# Patient Record
Sex: FEMALE | Race: WHITE | URBAN - METROPOLITAN AREA
[De-identification: names, ages, dates, MRNs, and addresses within clinical notes are randomized per-mention and may not be internally consistent; named-entity substitution may affect disease eponyms.]

---

## 2024-03-15 ENCOUNTER — HOSPITAL ENCOUNTER (OUTPATIENT)
Dept: RADIOLOGY | Facility: CLINIC | Age: 84
Discharge: HOME | End: 2024-03-15
Payer: MEDICARE

## 2024-03-15 ENCOUNTER — OFFICE VISIT (OUTPATIENT)
Dept: ORTHOPEDIC SURGERY | Facility: CLINIC | Age: 84
End: 2024-03-15
Payer: MEDICARE

## 2024-03-15 DIAGNOSIS — M25.551 PAIN OF RIGHT HIP: Primary | ICD-10-CM

## 2024-03-15 DIAGNOSIS — M25.551 PAIN OF RIGHT HIP: ICD-10-CM

## 2024-03-15 PROCEDURE — 1157F ADVNC CARE PLAN IN RCRD: CPT | Performed by: ORTHOPAEDIC SURGERY

## 2024-03-15 PROCEDURE — 99213 OFFICE O/P EST LOW 20 MIN: CPT | Performed by: ORTHOPAEDIC SURGERY

## 2024-03-15 PROCEDURE — 73502 X-RAY EXAM HIP UNI 2-3 VIEWS: CPT | Mod: RT

## 2024-03-15 PROCEDURE — 73502 X-RAY EXAM HIP UNI 2-3 VIEWS: CPT | Mod: RIGHT SIDE | Performed by: ORTHOPAEDIC SURGERY

## 2024-03-15 NOTE — PROGRESS NOTES
"                    History of present illness    83-year-old female well-known to me had right total hip replacement back in 2020 she states she does not have any hip pain whatsoever she has had back surgery she has numbness and tingling in both legs she states that her biggest issue is just awaited she is walking she still walks with a limp she still uses a rollator.  She states that her back surgeon told her that \"a tendon was cut when she had her hip replacement\" and that is why she is limping.      Past medical , Surgical, Family and social history reviewed.      Physical exam  General: No acute distress and breathing comfortably.  Patient is pleasant and cooperative with the examination.    Extremity  Examination shows her hip incision to be well-healed she is got full strength in abduction and adduction she is got good hip flexion as well no signs of any tendon defects or strength defects about her hip joint.  She does have weakness and decreased sensation distally in both lower extremities    Diagnostics      XR hip right with pelvis when performed 2 or 3 views    Result Date: 3/15/2024  Interpreted By:  Natalie Quesada, STUDY: XR HIP RIGHT WITH PELVIS WHEN PERFORMED 2 OR 3 VIEWS; 3/15/2024 10:34 am   INDICATION: Signs/Symptoms:pain.   ACCESSION NUMBER(S): [id number]   ORDERING CLINICIAN: NATALIE QUESADA   FINDINGS: Two views show patient status post total hip replacement in good alignment.  No signs of fracture dislocation or other bony abnormality       Signed by: Natalie Quesada 3/15/2024 11:50 AM Dictation workstation:   [id number]    XR lumbar spine 2-3 views    Result Date: 3/5/2024  * * *Final Report* * * DATE OF EXAM: Mar  5 2024  2:32PM   VHX   5228  -  XR LUMBAR 3V AP/LAT/L5-S1  / ACCESSION #  [id number] PROCEDURE REASON: S/P lumbar fusion      * * * * Physician Interpretation * * * *  EXAMINATION / TECHNIQUE:  XR LUMBAR 3V AP/LAT/L5-S1 PATIENT/TECHNOLOGIST PROVIDED HISTORY:   chronic " "pain CLINICAL INFORMATION (AS PROVIDED BY ORDERING CLINICIAN) :  S/P lumbar fusion COMPARISON: 4/18/2023 RESULT: Counting reference:  Lumbosacral junction. For the purposes of this report, L4-5 is considered the level of the iliac crest and there are 5 lumbar-type vertebrae.  Anatomic Variants: None. L4-S1 spinal fusion bilateral pedicle screws at L4 and S1 and intervertebral device at L5-S1. Hardware is intact. Posterior decompression. Normal lumbar lordosis. Mild left convex curvature. No significant spondylolisthesis. Vertebral body heights are preserved. No acute fracture is identified. Moderate to severe degenerative disc disease from L2 to L4 with disc height loss, degenerative endplate changes, and osteophytes. Moderate degenerative disc disease elsewhere in the lumbar spine. Vascular calcifications. Partially visualized hip arthroplasties. No significant interval change.    IMPRESSION: Post-operative and degenerative changes, as described. No significant interval change. : ERICA   Transcribe Date/Time: Mar  5 2024  4:29P Dictated by : COLTON TYSON MD This examination was interpreted and the report reviewed and electronically signed by: COLTON TYSON MD on Mar  5 2024  4:31PM  EST       Procedure  [ none]    Assessment  Status post right total hip replacement with gait disturbance    Treatment plan  1.  The natural history of the condition and its associated treatment alternatives including surgical and nonsurgical options were discussed with the patient at length.  2.  I reassured her structurally everything looks okay as far as her hip replacement does I do not see any signs of any \"tendon defects\" about her hip joint but she does have some significant weakness distally I think some physical therapy to work on gait training of her hip and knee would be appropriate she will follow-up with me in 6 to 8 weeks.  3. [   ]  4.  All of the patient's questions were answered.    Orders Placed " This Encounter    XR hip right with pelvis when performed 2 or 3 views    Referral to Physical Therapy       This note was prepared using voice recognition software.  The details of this note are correct and have been reviewed, and corrected to the best of my ability.  Some grammatical areas may persist related to the Dragon software    Homar Quesada MD  Senior Attending Physician  [facility]  [facility] [place]    [phone]

## 2024-04-03 ENCOUNTER — EVALUATION (OUTPATIENT)
Dept: PHYSICAL THERAPY | Facility: CLINIC | Age: 84
End: 2024-04-03
Payer: MEDICARE

## 2024-04-03 DIAGNOSIS — R26.2 DIFFICULTY WALKING: ICD-10-CM

## 2024-04-03 DIAGNOSIS — M25.551 PAIN OF RIGHT HIP: Primary | ICD-10-CM

## 2024-04-03 PROCEDURE — 97110 THERAPEUTIC EXERCISES: CPT | Mod: GP

## 2024-04-03 PROCEDURE — 97161 PT EVAL LOW COMPLEX 20 MIN: CPT | Mod: GP

## 2024-04-03 ASSESSMENT — PATIENT HEALTH QUESTIONNAIRE - PHQ9
SUM OF ALL RESPONSES TO PHQ9 QUESTIONS 1 AND 2: 0
2. FEELING DOWN, DEPRESSED OR HOPELESS: NOT AT ALL
1. LITTLE INTEREST OR PLEASURE IN DOING THINGS: NOT AT ALL

## 2024-04-03 ASSESSMENT — ENCOUNTER SYMPTOMS
LOSS OF SENSATION IN FEET: 1
OCCASIONAL FEELINGS OF UNSTEADINESS: 1
DEPRESSION: 0

## 2024-04-03 NOTE — PROGRESS NOTES
"Patient Name: Keira Hernandez  MRN: [id number]  Time Calculation  Start Time: 1100  Stop Time: 1145  Time Calculation (min): 45 min  PT Evaluation Time Entry  PT Evaluation (Low) Time Entry: 25  PT Therapeutic Procedures Time Entry  Therapeutic Exercise Time Entry: 20                   Current Problem  1. Pain of right hip  Referral to Physical Therapy      2. Difficulty walking          Insurance      [employer] BOA COPAY 35 // Nohemi confirmed 3/27/24 10:49am  DED 0 COVERAGE 100 OOP 4200(102)  AUTH REQ# [id number] 1 VISIT 4-3-24 THRU 4-17-24  REF# CORIN GALAVIZ I- [id number]/ALL       Visit number:  1  Subjective     General: Pt is a 83 y.o. female who goes by \"Leigh Ann\". Presenting to clinic with c/o difficulty walking and poor balance. Extensive surgical history with multiple back operations, bilateral JERZY, achilles tear, and right TKA. Most recent procedure was ~1 year ago for her back. Was walking with a cane prior to surgery, now feels she can only walk with a rollator and does not trust her balance. Has had multiple falls in the last year but denies any injury from them. Also having pain that is related to activity, prolonged positiong. Feels she is able to walk about 20 minutes consecutively before needing a break.  Main goal of therapy is to improve balance and return to walking with a cane    Pertinent medical hx:  multiple back surgeries, bilateral hip and right knee replacement, achilles tear (2002)     Precautions:    Pain:  Current:  0/10 with rest/sitting  High: 8/10 in back/legs    Reviewed medical screening form with pt and medical screening assessed    Imaging:     Objective   Spine Musculoskeletal Exam    Gait  Gait additional comments: Decreased step height, decreased hip/foot control in swing bilaterally, but notably on L>R. Foot into  Heavy UE reliance on device.     Range of Motion    Thoracolumbar        Thoracolumbar range of motion additional comments: 8 inch functional reach   80 lumbar " flexion  18 degrees lumbar extension   14 degrees lumbar SB bilaterally  (p+)     Strength    Thoracolumbar       Right      Hip flexion: 4+/5.        Left      Hip flexion: 4+/5.      General  General additional comments: Functional Reach:  8 inches     Hip Musculoskeletal Exam  Gait  Gait additional comments: Decreased step height, decreased hip/foot control in swing bilaterally, but notably on L>R. Foot into  Heavy UE reliance on device.     Strength    Right      Flexion: 4+/5.       Internal rotation: 4/5.       External rotation: 4/5.     Left      Flexion: 4+/5.       Internal rotation: 4/5.       External rotation: 4/5.     General  General additional comments: Functional Reach:  8 inches     Knee Musculoskeletal Exam  Gait  Gait additional comments: Decreased step height, decreased hip/foot control in swing bilaterally, but notably on L>R. Foot into  Heavy UE reliance on device.     Strength    Right      Extension: 4+/5.       Flexion: 4+/5.     Left      Extension: 4+/5.       Flexion: 4+/5.     General  General additional comments: Functional Reach:  8 inches         Outcome Measures:  LEFS:  45     5x STS:   10.7, average of 2 trials.   TUG: 10.78 (average of 3 trials) with device     Treatment: See HEP below      EDUCATION/HEP:    Yellow TB Seated hip IR with add squeeze, 2x10 each leg, 4-5x/week   SLS with counter/chair support:  6x30s each leg, 1x/day    **Emphasized importance of performing this exercise in a corner with BUE support and  stable object in front (not 4WW). Pt in agreement       Assessment:     Pt is a 83 y.o. presenting with the following primary deficits:  low back and BLE pain, decreased balance, and gait abnormalities. These deficits have lead to increased frequency of falls, as well as functional impairments with walking and stair navigation. Functional testing for TUG does not reveal additional fall risk with 4WW, and 5x STS is normative. Functional reach is below threshold for  fall risk. Recommend skilled PT to address the aforementioned deficits and allow pt to restore PLOF and maximize functional independence as she currently lives alone.     Clinical Presentation: Stable    Level of Complexity: moderate given pt's extensive surgical history     Goals:      Pt to be IND with HEP  2. Bilateral hip IR/ER  MMT to greater than or equal to 4+/5 to demonstrate improved hip muscle strength and stability for balance and ambulation.   3. Pt to demonstrate functional reach greater than or equal to 10 inches for reduction of fall risk.   4. Pt to self report LEFS score of greater than or equal to 54 to demonstrate statistically significant improvement in function.     Plan  1x/week for  8 visits     Balance work, gait training with 4WW, trial with cane when appropriate. Hip strengthening, lumbar ROM to maintain function.       Planned interventions include: blood flow restriction, aquatic therapy, biofeedback, cryotherapy, dry needling, edema control, education/instruction, electrical stimulation, gait training, home program, hot pack, kinesiotaping, manual therapy, neuromuscular re-education, self care/home management, therapeutic activities, therapeutic exercises, ultrasound and vasopneumatic device w/ cold.    No

## 2024-04-24 ENCOUNTER — TREATMENT (OUTPATIENT)
Dept: PHYSICAL THERAPY | Facility: CLINIC | Age: 84
End: 2024-04-24
Payer: MEDICARE

## 2024-04-24 PROCEDURE — 97110 THERAPEUTIC EXERCISES: CPT | Mod: GP,CQ

## 2024-04-24 NOTE — PROGRESS NOTES
Physical Therapy Treatment    Patient Name: Keira Hernandez  MRN: [id number]  Today's Date: 4/24/2024             Current Problem  1. Pain of right hip  Referral to Physical Therapy       2. Difficulty walking            Subjective     General   Extensive surgical history with multiple back operations, bilateral JERZY, achilles tear, and right TKA.     Main goal of therapy is to improve balance and return to walking with a cane   Insurance   [employer] BOA COPAY 35 // Nohemi confirmed 3/27/24 10:49am  DED 0 COVERAGE 100 OOP 4200(102)  AUTH REQ# [id number] 1 VISIT 4-3-24 THRU 4-17-24  REF# CORIN GALAVIZ I- [id number]/ALL     Visit number:  2  Precautions     Pain         Objective   Treatments:    Assessment     Plan:  Progress with POC as tolerated.    OP EDUCATION:       Goals:

## 2024-04-24 NOTE — PROGRESS NOTES
Physical Therapy Treatment    Patient Name: Keira Hernandez  MRN: [id number]  Today's Date: 4/24/2024  Time Calculation  Start Time: 1617  Stop Time: 1650  Time Calculation (min): 33 min  PT Therapeutic Procedures Time Entry  Therapeutic Exercise Time Entry: 30       Current Problem  1. Pain of right hip  Referral to Physical Therapy       2. Difficulty walking            Subjective     General   Extensive surgical history with multiple back operations, bilateral JERZY, achilles tear, and right TKA.     Pt. Reports she has pain in B LE's today L>R, and in her lumbar spine.     Insurance   [employer] BOA COPAY 35 // Nohemi confirmed 3/27/24 10:49am  DED 0 COVERAGE 100 OOP 4200(102)  AUTH REQ# [id number] 1 VISIT 4-3-24 THRU 4-17-24  REF# MARCELRIVIDHI GALVAIZ I- [id number]/ALL     Visit number:  2/8    Precautions     Pain    Objective   Treatments:  Prone lying   JAROCHO  Stand Ext's x20    Assessment   Pt. Stated prone lying abolished her radicular symptoms and slightly made the pain in her lumbar spine worse, which was explained to her that it was a good thing at this time. Trialed JAROCHO next and she stated there were still no radicular symptoms and her back became a little more uncomfortable. Pt. Then returned to prone lying and felt much better in this position and her back pain was eventually abolished as well. Pt. Given updated HEP to perform every 2hrs.     Plan:  Continue to focus on radicular symptoms and increase strength for ADLs.     OP EDUCATION:       Goals:   Main goal of therapy is to improve balance and return to walking with a cane

## 2024-04-30 NOTE — PROGRESS NOTES
"Physical Therapy Treatment    Patient Name: Keira Hernandez  MRN: [id number]  Today's Date: 5/1/2024  Time Calculation  Start Time: 1005  Stop Time: 1045  Time Calculation (min): 40 min     PT Therapeutic Procedures Time Entry  Neuromuscular Re-Education Time Entry: 10  Therapeutic Exercise Time Entry: 28                 Current Problem  1. Pain of right hip        2. Difficulty walking            Insurance:  [employer] MED[employer] BOA COPAY 35 // Nohemi confirmed 3/27/24 10:49am  DED 0 COVERAGE 100 OOP 4200(102)  AUTH REQ# [id number] 1 VISIT 4-3-24 THRU 4-17-24  REF# DEDTHU GALAVIZ I- [id number]/ALL      Visit number:  3/8     Precautions   none    Subjective   Subjective:   Pt says she has pain from her L waist down to foot. Pt says she hasn't done a lot today, so her pain isn't bad. Things like carrying groceries and vacuming. Pt says sometimes the standing back bends make pain go down her leg, but can't really get on her stomach to do laying ex. Pt states she usually feels better if she is standing up straight  Pain   1/10    Objective   Treatments:  Yellow TB Seated hip IR with add squeeze, 2x10 each leg,   Seated IR isometric 5\"x5 B  SLS with counter/chair support:  6x30s each leg,---  Sit to stands w/o UE 10x  Sidestepping //bars 3 laps  Step ups Fwd //bars B 10x ea  EC NBOS // bars 20\"x3  Standing hip abd/ext B // 10x ea  Marching // bars 15x    Prone lying--   JAROCHO--  Stand Ext's x20---     OP EDUCATION:   Strengthening B LE without causing more back/leg pain      Assessment:   Pt had a little increased L low back pain with seated Tb IR. Reminders with ex for good upright posture. Cues given with sit to stands to move more slowly. Pt had difficulty doing side stepping, as her hips would drop and she would fwd flex the trunk.Pt has difficulty performing hip rotational ex without compensations from other muscles. Pt displayed fair stability with NBOS. The increased L back pain that pt felt with some ex, went away when " she stopped the exercises.         Plan:   Continue to focus on radicular symptoms and increase strength for ADLs.

## 2024-05-01 ENCOUNTER — OFFICE VISIT (OUTPATIENT)
Dept: ORTHOPEDIC SURGERY | Facility: CLINIC | Age: 84
End: 2024-05-01
Payer: MEDICARE

## 2024-05-01 ENCOUNTER — TREATMENT (OUTPATIENT)
Dept: PHYSICAL THERAPY | Facility: CLINIC | Age: 84
End: 2024-05-01
Payer: MEDICARE

## 2024-05-01 DIAGNOSIS — M25.551 PAIN OF RIGHT HIP: Primary | ICD-10-CM

## 2024-05-01 DIAGNOSIS — R26.2 DIFFICULTY WALKING: ICD-10-CM

## 2024-05-01 PROCEDURE — 1157F ADVNC CARE PLAN IN RCRD: CPT | Performed by: ORTHOPAEDIC SURGERY

## 2024-05-01 PROCEDURE — 99213 OFFICE O/P EST LOW 20 MIN: CPT | Performed by: ORTHOPAEDIC SURGERY

## 2024-05-01 PROCEDURE — 97112 NEUROMUSCULAR REEDUCATION: CPT | Mod: GP,CQ

## 2024-05-01 PROCEDURE — 97110 THERAPEUTIC EXERCISES: CPT | Mod: GP,CQ

## 2024-05-01 NOTE — PROGRESS NOTES
Subjective    Patient ID: Keira    Chief Complaint:   Chief Complaint   Patient presents with    Right Hip - Follow-up     RT THR 8/20/20 with gait disturbance, s/p physical therapy     History of present illness    83-year-old female presented to clinic today for follow-up evaluation right SI joint dysfunction.  She states that she is doing much better after PT.  She still feels slight gait abnormality but this is improving.  She is ambulating with the assistance of a rollator.  She has mild discomfort which is constant.  She has been dealing with this chronically for many years.  Previous history of left total hip replacement with complications as well.      Past medical , Surgical, Family and social history reviewed.      Physical exam  General: No acute distress and breathing comfortably.  Patient is pleasant and cooperative with the examination.    Extremity  Right hip is neurovascular intact.  She has good range of motion today.  No groin pain.  Negative straight leg raise test.  No tenderness palpation over the hip bursa.  Improving strength.  No calf swelling or tenderness.  No instability.    Diagnostics  [ none]  No results found.     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  Patient was encouraged to continue with outpatient physical therapy  2.  Continue weightbearing activity as tolerated.  3.  Follow-up with us if she needs anything in future.   for complete plan and/or surgical details, please refer to Dr. Quesada's portion of the split/shared dictation.  4.  All of the patient's questions were answered.    No orders of the defined types were placed in this encounter.      This note was prepared using voice recognition software.  The details of this note are correct and have been reviewed, and corrected to the best of my ability.  Some grammatical areas may persist related to the Dragon software    Rudolph Yost PA-C,  MPAS  [facility]  [facility] [place]    [phone]    In a face-to-face encounter performed today, I Homar Quesada MD performed a history and physical examination, discussed pertinent diagnostic studies if indicated, and discussed diagnosis and management strategies with both the patient and the midlevel provider.  I reviewed the midlevel's note and agree with the documented findings and plan of care.  Greater than 50% of the evaluation and treatment decision was performed by the physician myself during today's visit.    83-year-old female here for follow-up of her hip on the right side she states she doing better the therapy definitely seems to be helping she still using a rollator for assistive device but happy with her progress so far in therapy.  On examination she has good range of motion no tenderness to palpation improved muscle strength.  Plan is to continue with her physical therapy continue work on range of motion and strengthening and follow-up with me if she has increased pain or discomfort.      Patient has severe impairment related to her presenting condition.  It is significantly impairing bodily function.  We did discuss surgical and nonsurgical options.    This note was prepared using voice recognition software.  The details of this note are correct and have been reviewed, and corrected to the best of my ability.  Some grammatical areas may persist related to the Dragon software    Homar Quesada MD  Senior Attending Physician  [facility]    [phone]

## 2024-05-08 ENCOUNTER — TREATMENT (OUTPATIENT)
Dept: PHYSICAL THERAPY | Facility: CLINIC | Age: 84
End: 2024-05-08
Payer: MEDICARE

## 2024-05-08 DIAGNOSIS — M25.551 PAIN OF RIGHT HIP: Primary | ICD-10-CM

## 2024-05-08 DIAGNOSIS — R26.2 DIFFICULTY WALKING: ICD-10-CM

## 2024-05-08 PROCEDURE — 97110 THERAPEUTIC EXERCISES: CPT | Mod: GP,CQ

## 2024-05-08 PROCEDURE — 97112 NEUROMUSCULAR REEDUCATION: CPT | Mod: GP,CQ

## 2024-05-14 NOTE — PROGRESS NOTES
"Physical Therapy Treatment    Patient Name: Keira Hernandez  MRN: [id number]  Today's Date: 5/15/2024  Time Calculation  Start Time: 1008  Stop Time: 1048  Time Calculation (min): 40 min     PT Therapeutic Procedures Time Entry  Neuromuscular Re-Education Time Entry: 10  Therapeutic Exercise Time Entry: 28                 Current Problem  1. Pain of right hip        2. Difficulty walking            Insurance:  ANTH MEDICARE BOA COPAY 35 // Nohemi confirmed 3/27/24 10:49am  DED 0 COVERAGE 100 OOP 4200(102)  AUTH REQ# [id number] 1 VISIT 4-3-24 THRU 4-17-24  REF# CORIN GALAVIZ I- [id number]/ALL      Visit number:  5/8  Precautions   none    Subjective   Subjective:   Pt says she has been doing some of the exercises, but can't do too many, because her back still hurts. Pt has been using a roll behind back for support. Pt had cramps in the L LE last night.  Pain   0/10    Objective   Treatments:  Yellow TB Seated hip IR with add squeeze, 2x10 each leg  Seated IR isometric 5\"x7 B---  SLS with counter/chair support:  6x30s each leg,---  Sit to stands w/o UE   10x2  Sidestepping //bars YTB 2 laps---  Step ups Fwd 6 in B 12x ea  Step ups Lat 6in B 12x ea  Tap downs B 5x ea  Hip hiking B 10x ea  EC NBOS // bars 20\"x3----  Standing hip abd/ext B YTB 10x ea (ext only today)---  Marching w/YTB15x 10x---  Step overs // bars 4in B 12x ea  // bars SLB with knee slightly bent Reviewed     Prone lying--   JAROCHO--  Stand Ext's x20---  OP EDUCATION:   SLB w/ knee slightly bent      Assessment:   Pt challenged to use correct form with tap downs and hip hikes, even with tactile and verbal cues provided. Pt had some R Low back pain when doing L tap downs. Pt able to use good control with sit to stands. Pt performed step overs in // bars without as much of the R hip pushing out laterally. Trialed amb with cane in R hand to avoid L shoulder leaning. Pt R knee tends to extend back quickly.Initially pt used better form, but then the lean " returned. Upon leaving clinic advised to be mindful of speed and hip. Pt able to amb without as much of a deviated gait    Plan:   Cont to improve hip strength, gait, and balance

## 2024-05-15 ENCOUNTER — TREATMENT (OUTPATIENT)
Dept: PHYSICAL THERAPY | Facility: CLINIC | Age: 84
End: 2024-05-15
Payer: MEDICARE

## 2024-05-15 DIAGNOSIS — R26.2 DIFFICULTY WALKING: ICD-10-CM

## 2024-05-15 DIAGNOSIS — M25.551 PAIN OF RIGHT HIP: Primary | ICD-10-CM

## 2024-05-15 PROCEDURE — 97112 NEUROMUSCULAR REEDUCATION: CPT | Mod: GP,CQ

## 2024-05-15 PROCEDURE — 97110 THERAPEUTIC EXERCISES: CPT | Mod: GP,CQ

## 2024-05-21 NOTE — PROGRESS NOTES
"Physical Therapy Treatment    Patient Name: Keira Hernandez  MRN: [id number]  Today's Date: 5/22/2024  Time Calculation  Start Time: 1006  Stop Time: 1050  Time Calculation (min): 44 min     PT Therapeutic Procedures Time Entry  Neuromuscular Re-Education Time Entry: 12  Therapeutic Exercise Time Entry: 30                 Current Problem  1. Pain of right hip        2. Difficulty walking            Insurance:  ANTH MEDICARE BOA COPAY 35 // Nohemi confirmed 3/27/24 10:49am  DED 0 COVERAGE 100 OOP 4200(102)  AUTH REQ# [id number] 1 VISIT 4-3-24 THRU 4-17-24  REF# CORIN GALAVIZ I- [id number]/ALL   Visit number:  6/8    Precautions   none    Subjective   Subjective:   Pt states that her back is doing okay. She still gets pain if she walks for long periods, or if she sits for too long with towel roll behind her back.   Pain   0/10    Objective   Treatments:  Yellow TB Seated hip IR with add squeeze, 2x10 each leg-Reviewed    Sit to stands w/o UE   10x2 (10x w/ TB around thighs, 10x with staggard stance/R closer)   W Sidestepping //bars YTB 2 laps  Monster walks // bars YTB 2 laps  Step ups Fwd 6 in B 12x ea  Step ups Lat 6in B 12x ea  Standing glute med kicks B (YTB only for R kicking) 10x2  Step overs // bars 6in B 12x ea    Seated IR isometric 5\"x7 B---  SLS with counter/chair support:  6x30s each leg,---  Tap downs B 5x ea--  Hip hiking B 10x ea---  EC NBOS // bars 20\"x3----  Standing hip abd/ext B YTB 10x ea (ext only today)---  Marching w/YTB15x 10x---  // bars SLB with knee slightly bent---     Prone lying--   JAROCHO--  Stand Ext's x20---  OP EDUCATION:   Postural awareness, and not amb too quickly      Assessment:   Pt had observable L pelvis drop when doing R fwd step ups. Pelvis dropping wasn't as noticeable with L step ups. Pt had increased L low back pain with lat step ups. Pt had difficulty standing on R side for L glute kicks, thus did without TB. Introduced more hip strengthening with W and moster stepping. Pt " had to use UE support with these. Able to increase height for step ups, without sacrificing form.     Plan:   Cont with hip strengthening.

## 2024-05-22 ENCOUNTER — TREATMENT (OUTPATIENT)
Dept: PHYSICAL THERAPY | Facility: CLINIC | Age: 84
End: 2024-05-22
Payer: MEDICARE

## 2024-05-22 DIAGNOSIS — R26.2 DIFFICULTY WALKING: ICD-10-CM

## 2024-05-22 DIAGNOSIS — M25.551 PAIN OF RIGHT HIP: Primary | ICD-10-CM

## 2024-05-22 PROCEDURE — 97110 THERAPEUTIC EXERCISES: CPT | Mod: GP,CQ

## 2024-05-22 PROCEDURE — 97112 NEUROMUSCULAR REEDUCATION: CPT | Mod: GP,CQ

## 2024-05-29 ENCOUNTER — APPOINTMENT (OUTPATIENT)
Dept: PHYSICAL THERAPY | Facility: CLINIC | Age: 84
End: 2024-05-29
Payer: MEDICARE

## 2024-06-06 ENCOUNTER — TREATMENT (OUTPATIENT)
Dept: PHYSICAL THERAPY | Facility: CLINIC | Age: 84
End: 2024-06-06
Payer: MEDICARE

## 2024-06-06 DIAGNOSIS — R26.2 DIFFICULTY WALKING: ICD-10-CM

## 2024-06-06 DIAGNOSIS — M25.551 PAIN OF RIGHT HIP: Primary | ICD-10-CM

## 2024-06-06 PROCEDURE — 97110 THERAPEUTIC EXERCISES: CPT | Mod: GP

## 2024-06-06 NOTE — PROGRESS NOTES
"Patient Name: Keira Hernandez  MRN: [id number]  Time Calculation  Start Time: 0829  Stop Time: 0911  Time Calculation (min): 42 min     PT Therapeutic Procedures Time Entry  Therapeutic Exercise Time Entry: 42                     Current Problem  1. Pain of right hip        2. Difficulty walking            Insurance  [employer] BO[employer] COPAY 35 // Nohemi confirmed 3/27/24 10:49am  DED 0 COVERAGE 100 OOP 4200(102)  AUTH REQ# [id number] 1 VISIT 4-3-24 THRU 4-17-24  REF# DEDRIVIDHI S I- [id number]/ALL   Subjective     General  Pt feels she has made great progress to this point, feels much more confident with her balance than before. States she will occasionally walk without her device as long as she has objects to steady herself on  Precautions    Pain  0/10    Objective     Trendelenburg gait persists, decreased LE control through swing to initial contact (R>L)     SL balance:  unable to hold for >1s without UE support. 40s bilaterally with single UE support in // bars    LEFS:  45     Hip MMT    RLE  IR:  4+/5  ER:  4+/5    LLE  IR:  4+/5  ER:  4+/5     Functional reach:  11 inches     Treatments:    STS no UE support:  1x15, x10 w/ unsupported march   Step taps:  x15 BLE , use of single UE support (cues for wide TAVIA  Standing hip abd:  BW RLE, YTB LLE:  x15   SLS in // bars with single UE support :  x3 to fatigue BLE (40s for both)  Step ups in // bars:  x15, 6\" step     NT:    W Sidestepping //bars YTB 2 laps  Monster walks // bars YTB 2 laps  Step ups Lat 6in B 12x ea  Standing glute med kicks B (YTB only for R kicking) 10x2  Step overs // bars 6in B 12x ea    OP EDUCATION/HEP:  Discussed pt's current progress. Pt and PT in agreement that more visits would be beneficial to her as she lives alone and wants to maximize her functional independence.           Assessment   Pt seen for re-check this time. Has made good progress towards her goals currently. Improvements most notable with her functional reach and hip " rotation strength. Overall pt feels she is moving better and is more confident in her balance. Deficits most notably persisting  with hip strength and SL balance. Recommend continued skilled PT to address these deficits and allow pt to maximize her functional independence with mobility.     Pt to be IND with HEP (in progress)   2. Bilateral hip IR/ER  MMT to greater than or equal to 4+/5 to demonstrate improved hip muscle strength and stability for balance and ambulation. (MET)   3. Pt to demonstrate functional reach greater than or equal to 10 inches for reduction of fall risk. (MET)  4. Pt to self report LEFS score of greater than or equal to 54 to demonstrate statistically significant improvement in function (NOT MET)   Plan     Continue per previous POC. Emphasis on hip abduction strengthening, SLS practice, integrate SPC gait training if able

## 2024-06-26 ENCOUNTER — TREATMENT (OUTPATIENT)
Dept: PHYSICAL THERAPY | Facility: CLINIC | Age: 84
End: 2024-06-26
Payer: MEDICARE

## 2024-06-26 DIAGNOSIS — R26.2 DIFFICULTY WALKING: ICD-10-CM

## 2024-06-26 DIAGNOSIS — M25.551 PAIN OF RIGHT HIP: Primary | ICD-10-CM

## 2024-06-26 PROCEDURE — 97110 THERAPEUTIC EXERCISES: CPT | Mod: GP

## 2024-06-26 PROCEDURE — 97112 NEUROMUSCULAR REEDUCATION: CPT | Mod: GP

## 2024-06-26 NOTE — PROGRESS NOTES
"Patient Name: Keira Hernandez  MRN: [id number]  Time Calculation  Start Time: 1002  Stop Time: 1041  Time Calculation (min): 39 min     PT Therapeutic Procedures Time Entry  Neuromuscular Re-Education Time Entry: 31  Therapeutic Exercise Time Entry: 8                     Current Problem  1. Pain of right hip        2. Difficulty walking            Insurance  [employer] BO[employer] COPAY 35 // Nohemi confirmed 3/27/24 10:49am  DED 0 COVERAGE 100 OOP 4200(102)  AUTH REQ# [id number] 1 VISIT 4-3-24 THRU 4-17-24  REF# DEDRIVIDHI S I- [id number]/ALL   Subjective     General  Pt reports things have gone well since re-check on 6/6, no major changes since then.   Precautions    Pain  1/10 in left low back.    Objective     Treatments:  Ther-ex:  STS no UE support:  1x15  Standing hip abd:  BW RLE, YTB LLE:  x15   Step taps:  2x20 BLE , 2#->3# use of minimal UE support.     Neuro re-ed:  Navigating obstacle in // bars:  ~4\" clearance, 2x10 over and back  SLS in // bars with single UE support :  x3 to fatigue BLE (45-50s)  Ambulation w/o device:  x5 laps, external cue to avoid scissoring across line on the floor when placing foot. (Min-modA throughout, gait belt donned)     NT:  Step ups in // bars:  x15, 6\" step    OP EDUCATION/HEP:  Encouraged pt to continue practicing SLS at home, weaning off of using UE support as able    Assessment   Pt with good tolerance to exercises this date, continued with balance/gait progressions. Added weight to step taps, introduced activities to improve hip flexion/foot clearance during gait. Pt required VC to slow down step overs to focus on improving hip flexion, good carryover of cues with repetition. Pt fatigued at EOS but no adverse effects noted. Min-modA required with ambulation. Recommend continued skilled PT to improve BLE strength and balance in order to maximize safety and independence with functional mobility.     Plan     Continue per POC. Progress balance activities/continue " practicing gait w/o, practice for foot placement/controle

## 2024-07-03 ENCOUNTER — TREATMENT (OUTPATIENT)
Dept: PHYSICAL THERAPY | Facility: CLINIC | Age: 84
End: 2024-07-03
Payer: MEDICARE

## 2024-07-03 DIAGNOSIS — M25.551 PAIN OF RIGHT HIP: Primary | ICD-10-CM

## 2024-07-03 DIAGNOSIS — R26.2 DIFFICULTY WALKING: ICD-10-CM

## 2024-07-03 PROCEDURE — 97110 THERAPEUTIC EXERCISES: CPT | Mod: GP

## 2024-07-03 PROCEDURE — 97112 NEUROMUSCULAR REEDUCATION: CPT | Mod: GP

## 2024-07-03 NOTE — PROGRESS NOTES
"Patient Name: Keira Hernandez  MRN: [id number]  Time Calculation  Start Time: 1216  Stop Time: 1300  Time Calculation (min): 44 min     PT Therapeutic Procedures Time Entry  Neuromuscular Re-Education Time Entry: 29  Therapeutic Exercise Time Entry: 15                     Current Problem  1. Pain of right hip        2. Difficulty walking            Insurance  [employer] BOA COPAY 35 // Nohemi confirmed 3/27/24 10:49am  DED 0 COVERAGE 100 OOP 4200(102)  AUTH REQ# [id number] 1 VISIT 4-3-24 THRU 4-17-24  REF# DEDRIE S I- [id number]/ALL   Subjective     General  Pt reports \"I always feels good, I just can't walk well \". No major changes since last visit   Precautions    Pain  0/10    Objective     Treatments:    Ther-ex:  -STS no UE support:  1x15  -Standing hip abd:  YTB 2x10 BLE in // bars   -Step taps:  2x20 BLE , 4# use of minimal UE support, 6\" step      Neuro re-ed:  -Navigating obstacle in // bars:  ~4\" clearance, 1x10 over and back  -Progressed to 6\" step, full step overs with focus on wide TAVIA upon forward/backward stepping  -Ambulation w/o device:  x5 laps, external cue to avoid scissoring across line on the floor when placing foot. (CGA-Darrion throughout, gait belt donned)       NT:   SLS in // bars with single UE support :  x3 to fatigue BLE (45-50s)  OP EDUCATION/HEP:  No changes to HEP at this time     Assessment   Good tolerance to interventions this date. Increased resistance of hip abduction with RLE, and increased height of obstacle navigation in // bars. Pt demo's good carryover of cues to avoid scissoring gait pattern during ambulation without device, though increased difficulty with fatigue. Overall no adverse effects noted throughout, pt showing good effort throughout and felt challenged. Recommend continued skilled PT to improve BLE strength and balance in order to maximize safety and independence with functional mobility.     Plan     Continue per POC. Progress balance activities/continue " practicing gait w/o, practice for foot placement/control

## 2024-07-09 NOTE — PROGRESS NOTES
"Physical Therapy Treatment    Patient Name: Keira Hernandez  MRN: [id number]  Today's Date: 7/10/2024  Time Calculation  Start Time: 1008  Stop Time: 1048  Time Calculation (min): 40 min     PT Therapeutic Procedures Time Entry  Neuromuscular Re-Education Time Entry: 15  Therapeutic Exercise Time Entry: 23                 Current Problem  1. Pain of right hip        2. Difficulty walking            Insurance:  [employer] BOA COPAY 35 // Nohemi confirmed 3/27/24 10:49am  DED 0 COVERAGE 100 OOP 4200(102)  AUTH REQ# [id number] 1 VISIT 4-3-24 THRU 4-17-24  REF# DEDRIVIDHI S I- [id number]/ALL     ANTHEM APPROVED 5  PT VISITS  6-6-24 THRU 8-4-24  AUTH# [id number]        [id number]/ALL   Total visit# 10/12  Precautions   Fall risk    Subjective   Subjective:   Pt reports that her L hip is still bothering her, usually the more she does, the more pain she gets.   Pain   0/10    Objective   Treatments:  Ther-ex:  -STS no UE support:  2x10  -Standing hip abd:  YTB 2x10 BLE in // bars   -sidestepping // bars YTB 2 laps  -Step taps:  2x20 BLE , 4# use of minimal UE support, 6\" step ---  -Hip hikes 4 in step B 10x ea      Neuro re-ed:  -Navigating obstacle in // bars:  ~4\" clearance, 1x10 over and back---  -Progressed to 8\" step, full step overs with focus on wide TAVIA upon forward/backward stepping  -Ambulation w/o device:  x4 laps, external cue to avoid scissoring across line on the floor when placing foot. (CGA-Darrion throughout, gait belt donned)         NT:   SLS in // bars with single UE support :  x3 to fatigue BLE (45-50s)  OP EDUCATION:   Use walker for safety      Assessment:   Pt challenged with L standing hip abd, more so than the R kicking. Pt able to use better control with sidestepping. Able to do step overs without as much lateral shifting. R knee tends to valgus a lot with step ups. Pt had a lot of fwd flexion during amb and cues needed to slow down. Pt had more difficulty when trying to hike up the R side. Normal " fatigue with therapy session.    Plan:   Progress balance activities/continue practicing gait w/o, practice for foot placement/control

## 2024-07-10 ENCOUNTER — TREATMENT (OUTPATIENT)
Dept: PHYSICAL THERAPY | Facility: CLINIC | Age: 84
End: 2024-07-10
Payer: MEDICARE

## 2024-07-10 DIAGNOSIS — M25.551 PAIN OF RIGHT HIP: Primary | ICD-10-CM

## 2024-07-10 DIAGNOSIS — R26.2 DIFFICULTY WALKING: ICD-10-CM

## 2024-07-10 PROCEDURE — 97110 THERAPEUTIC EXERCISES: CPT | Mod: GP,CQ

## 2024-07-10 PROCEDURE — 97112 NEUROMUSCULAR REEDUCATION: CPT | Mod: GP,CQ

## 2024-07-17 ENCOUNTER — TREATMENT (OUTPATIENT)
Dept: PHYSICAL THERAPY | Facility: CLINIC | Age: 84
End: 2024-07-17
Payer: MEDICARE

## 2024-07-17 DIAGNOSIS — R26.2 DIFFICULTY WALKING: ICD-10-CM

## 2024-07-17 DIAGNOSIS — M25.551 PAIN OF RIGHT HIP: Primary | ICD-10-CM

## 2024-07-17 PROCEDURE — 97110 THERAPEUTIC EXERCISES: CPT | Mod: GP

## 2024-07-17 NOTE — PROGRESS NOTES
Patient Name: Keira Hernandez  MRN: [id number]  Time Calculation  Start Time: 1003  Stop Time: 1027  Time Calculation (min): 24 min     PT Therapeutic Procedures Time Entry  Therapeutic Exercise Time Entry:                      Current Problem  1. Pain of right hip        2. Difficulty walking            Insurance  ANTH MEDICARE BOA COPAY 35 // Nohemi confirmed 3/27/24 10:49am  DED 0 COVERAGE 100 OOP 4200(102)  AUTH REQ# [id number] 1 VISIT 4-3-24 THRU 24  REF# DEDRIVIDHI S I- [id number]/ALL      ANTHEM APPROVED 5  PT VISITS  24 THRU 24  AUTH# [id number]        [id number]/ALL   Total visit#     Subjective     General  Pt reports she feels much more confident now vs when she started therapy. Has started using a cane sparingly at home, thinks she is moving faster and more steadily. Overall is ready for discharge at this time.   Precautions    Pain  0/10    Objective     LEFS: 43    5xSTS: 10.09 s    TU.18s    SL balance:    6s R no UE support    2s L no UE support        Hip MMT     Flexion:  4+/5 josee    L: 4+/5 hip ER , 5/5 IR    R: 5/5 hip IR/ER     Treatments:    X15 STS no UE support  Re-check:  15'    OP EDUCATION/HEP:  Discussed importance of continuing HEP to maintain therapeutic gains    Assessment   Pt has made good progress to this point, most goals met at previous re-check, though focus of recent sessions was balance/SLS which has seen a noted improvement vs. Evaluation. Pt now able to perform unsupported SLS, albeit briefly and still limited. Noted improvements in 5xSTS and TUG times, though not to threshold of statistical significance. Also demo's improved L hip strength vs last re-check. Pt endorses feeling much more confident with her balance, and feels stronger overall. States she is ready for discharge, agree that this is appropriate at this time. Encouraged pt to reach out with any questions/concerns in the future.     Plan     Discharge pt to be IND with HEP